# Patient Record
Sex: FEMALE | Race: WHITE | NOT HISPANIC OR LATINO | Employment: OTHER | URBAN - METROPOLITAN AREA
[De-identification: names, ages, dates, MRNs, and addresses within clinical notes are randomized per-mention and may not be internally consistent; named-entity substitution may affect disease eponyms.]

---

## 2021-06-29 ENCOUNTER — MEDICAL CORRESPONDENCE (OUTPATIENT)
Dept: HEALTH INFORMATION MANAGEMENT | Facility: CLINIC | Age: 81
End: 2021-06-29

## 2021-06-30 ENCOUNTER — MEDICAL CORRESPONDENCE (OUTPATIENT)
Dept: HEALTH INFORMATION MANAGEMENT | Facility: CLINIC | Age: 81
End: 2021-06-30

## 2021-08-31 ENCOUNTER — TELEPHONE (OUTPATIENT)
Dept: PEDIATRICS | Facility: CLINIC | Age: 81
End: 2021-08-31

## 2021-09-28 NOTE — TELEPHONE ENCOUNTER
"*Original [employer] appointment on 9/28/21 rescheduled due to I-123 dose not making flight from [place].   Per patient's initial pre-auth:  \"Approved with [facility] [place]- Auth # [id number]. Valid 9/22/2021-03/20/2022\"  New I-123 DATSCAN dose has been ordered for 11/9/2021.  Confirmation Number: [id number]    "

## 2021-10-21 NOTE — TELEPHONE ENCOUNTER
Patient rescheduled from 9/28/2021 to 11/09/2021. Approval with [facility] [place] is still valid. Okay to proceed.

## 2021-11-09 ENCOUNTER — ANCILLARY PROCEDURE (OUTPATIENT)
Dept: NUCLEAR MEDICINE | Facility: CLINIC | Age: 81
End: 2021-11-09
Attending: PSYCHIATRY & NEUROLOGY
Payer: COMMERCIAL

## 2021-11-09 DIAGNOSIS — G24.4 OROFACIAL DYSKINESIA: ICD-10-CM

## 2021-11-09 DIAGNOSIS — K08.109 EDENTULOUS: ICD-10-CM

## 2021-11-09 DIAGNOSIS — G20.C PARKINSONISM, UNSPECIFIED PARKINSONISM TYPE (H): ICD-10-CM

## 2021-11-09 PROCEDURE — A9584 IODINE I-123 IOFLUPANE: HCPCS

## 2021-11-09 PROCEDURE — 78803 RP LOCLZJ TUM SPECT 1 AREA: CPT | Mod: GC

## 2022-02-06 ENCOUNTER — HEALTH MAINTENANCE LETTER (OUTPATIENT)
Age: 82
End: 2022-02-06

## 2022-10-03 ENCOUNTER — HEALTH MAINTENANCE LETTER (OUTPATIENT)
Age: 82
End: 2022-10-03

## 2023-02-11 ENCOUNTER — HEALTH MAINTENANCE LETTER (OUTPATIENT)
Age: 83
End: 2023-02-11

## 2024-03-09 ENCOUNTER — HEALTH MAINTENANCE LETTER (OUTPATIENT)
Age: 84
End: 2024-03-09